# Patient Record
Sex: FEMALE | Race: WHITE | URBAN - METROPOLITAN AREA
[De-identification: names, ages, dates, MRNs, and addresses within clinical notes are randomized per-mention and may not be internally consistent; named-entity substitution may affect disease eponyms.]

---

## 2017-05-27 ENCOUNTER — OFFICE VISIT (OUTPATIENT)
Dept: FAMILY MEDICINE CLINIC | Facility: CLINIC | Age: 62
End: 2017-05-27

## 2017-05-27 VITALS
SYSTOLIC BLOOD PRESSURE: 162 MMHG | RESPIRATION RATE: 14 BRPM | WEIGHT: 220 LBS | HEART RATE: 84 BPM | TEMPERATURE: 98 F | HEIGHT: 63 IN | DIASTOLIC BLOOD PRESSURE: 98 MMHG | BODY MASS INDEX: 38.98 KG/M2

## 2017-05-27 DIAGNOSIS — J01.40 ACUTE NON-RECURRENT PANSINUSITIS: Primary | ICD-10-CM

## 2017-05-27 DIAGNOSIS — R03.0 ELEVATED BLOOD PRESSURE READING WITHOUT DIAGNOSIS OF HYPERTENSION: ICD-10-CM

## 2017-05-27 PROCEDURE — 99202 OFFICE O/P NEW SF 15 MIN: CPT | Performed by: NURSE PRACTITIONER

## 2017-05-27 RX ORDER — AMOXICILLIN AND CLAVULANATE POTASSIUM 875; 125 MG/1; MG/1
1 TABLET, FILM COATED ORAL 2 TIMES DAILY
Qty: 14 TABLET | Refills: 0 | Status: SHIPPED | OUTPATIENT
Start: 2017-05-27 | End: 2017-06-03

## 2017-05-27 NOTE — PROGRESS NOTES
CHIEF COMPLAINT:   Patient presents with:  URI      HPI:   Palma Mccollum is a 59year old female who presents with URI symptoms for 10 days. Onset was gradual.  Symptoms are progressing. Location is reported as mid face.   Description is reported as p HEAD: atraumatic, normocephalic, mild tenderness on palpation of maxillary sinuses is present.   EYES: conjunctiva clear, EOM intact, normal pupils, PERRLA  EARS: Canals are clear with no discharge or inflammation, TM's are pearly white and intact, no bulgi Sig: Take 1 tablet by mouth 2 (two) times daily. Risks, benefits, side effects of medication addressed and explained to patient.     2. Blood Pressure    Patient instructed to follow up with her PCP at next opportunity to further evaluate stefano ABRS may be diagnosed if you’ve had an upper respiratory infection like a cold and cough for longer than 10 to 14 days. Your health care provider will ask about your symptoms and your medical history.  The provider will check your vital signs, including you What is High Blood Pressure? High blood pressure (also called hypertension) is known as the “silent killer.” This is because most of the time it doesn’t cause symptoms. In fact, many people don’t know they have it until other problems develop.  In most [place] If your blood pressure is too high, work with your doctor on a plan for lowering it. Below are steps you can take that will help lower your blood pressure. · Choose heart-healthy foods. Eating healthier meals helps you control your blood pressure.  Ask you · Medicine is only one part of controlling high blood pressure. You also need to manage your weight, get regular exercise, and adjust your eating habits. · High blood pressure is usually a lifelong problem.  But it can be controlled with healthy lifestyle Do you eat a lot of salty, fatty, fried, or greasy foods? Do you go to restaurants or eat out frequently? ? Alcohol consumption  Do you drink more than 1 drink a day if you are a female or more than 2 drinks a day if you male?   ?  If you smoke or someone

## 2017-05-27 NOTE — PATIENT INSTRUCTIONS
Acute Bacterial Rhinosinusitis (ABRS)  Acute bacterial rhinosinusitis (ABRS) is an infection of your nasal cavity and sinuses. It’s caused by bacteria. Acute means that you’ve had symptoms for less than 12 weeks.   Understanding your sinuses  The nasal ca · Nasal decongestant medicine. Spray or drops may help to lessen congestion. Do not use them for more than a few days. · Salt wash (saline irrigation). This can help to loosen mucus.   Possible complications of ABRS  ABRS may come back or become long-term The circulatory system is made up of the heart and blood vessels that carry blood through the body. Your heart is the pump for this system. With each heartbeat (contraction), the heart sends blood out through large blood vessels called arteries.  Blood pres · Choose heart-healthy foods. Eating healthier meals helps you control your blood pressure. Ask your doctor about the DASH eating plan. This plan helps reduce blood pressure by limiting the amount of sodium (salt) you have in your diet.  DASH also encourage · High blood pressure is usually a lifelong problem. But it can be controlled with healthy lifestyle changes and medicine. · Hypertension is not the same as stress. Although stress may be a factor in high blood pressure, it’s only one part of the story. Do you drink more than 1 drink a day if you are a female or more than 2 drinks a day if you male?   ? If you smoke or someone close to you smokes  Do you smoke cigarettes or cigars, chew tobacco, or dip snuff?  Are you exposed to second-hand smoke on a regu

## 2018-04-18 ENCOUNTER — OFFICE VISIT (OUTPATIENT)
Dept: FAMILY MEDICINE CLINIC | Facility: CLINIC | Age: 63
End: 2018-04-18

## 2018-04-18 VITALS
SYSTOLIC BLOOD PRESSURE: 132 MMHG | HEART RATE: 93 BPM | OXYGEN SATURATION: 97 % | DIASTOLIC BLOOD PRESSURE: 96 MMHG | BODY MASS INDEX: 38.98 KG/M2 | TEMPERATURE: 98 F | WEIGHT: 220 LBS | HEIGHT: 63 IN

## 2018-04-18 DIAGNOSIS — R03.0 ELEVATED BLOOD PRESSURE READING: ICD-10-CM

## 2018-04-18 DIAGNOSIS — J00 ACUTE NASOPHARYNGITIS: Primary | ICD-10-CM

## 2018-04-18 PROCEDURE — 99212 OFFICE O/P EST SF 10 MIN: CPT | Performed by: NURSE PRACTITIONER

## 2018-04-18 NOTE — PROGRESS NOTES
CHIEF COMPLAINT:   Patient presents with:  URI: X 3 days, felt warm      HPI:   Anastacia Motley is a 57year old female who presents for upper respiratory symptoms for  3 days.  Patient reports sore throat only at the beginning of sx's, congestion, dry cou THROAT: Oral mucosa pink, moist. Posterior pharynx is not erythematous. no exudates. Tonsils 0/4. Uvula midline. PND +  NECK: Supple, non-tender  LUNGS: clear to auscultation bilaterally, no wheezes or rhonchi. Breathing is non labored.   CARDIO: RRR withou May consider OTC Cepacol as needed and directed on packaging fore sore throat. RTC if symptoms worsening/persistant or if new concerning symptoms present.      Patient Instructions       · Common colds are caused by viruses which are not eradicated with · You may use acetaminophen or ibuprofen to control pain and fever, unless another medicine was prescribed. If you have chronic liver or kidney disease, have ever had a stomach ulcer or gastrointestinal bleeding, or are taking blood-thinning medicines, thelma Colds are the most common illness that people get. Most adults get 2 or 3 colds per year, and most children get 5 to 7 colds per year. Colds may be caused by over 200 types of viruses. The most common of these are rhinoviruses (“rhino” refers to the nose). You can help reduce the spread of cold viruses. This can help both you and others avoid getting colds. Follow these tips:  · Wash your hands well anytime you may have come into contact with cold viruses. Wash your hands for at least 20 seconds.  When you ca Brand Names:  Altarussin DM, Cheracol D, Delsym Children's Cough + Chest Congestion DM, Delsym Cough + Chest Congestion DM, Diabetic Tussin DM, Duraganidin DM, Gani-Tuss DM NR, Genatuss DM, Guai-Dex, Guiatuss DM, Iophen DM-NR, Mucinex Children's Cough, Muci Do not take this medicine with any of the following medications:  · MAOIs like Carbex, Eldepryl, Marplan, Nardil, and Parnate  · procarbazine  This medicine may also interact with the following medications:  · other medicines for colds or allergy  · medici NOTE:This sheet is a summary. It may not cover all possible information. If you have questions about this medicine, talk to your doctor, pharmacist, or health care provider.  Copyright© 2017 [employer]vier            The patient indicates understanding of these i

## 2018-04-18 NOTE — PATIENT INSTRUCTIONS
· Common colds are caused by viruses which are not eradicated with antibiotics  · Cold remedies are to relieve symptoms and prevent complications rather than cure infection  · Rest and increased oral fluid intake is advised  · Increase humidity of the ai · You may use acetaminophen or ibuprofen to control pain and fever, unless another medicine was prescribed. If you have chronic liver or kidney disease, have ever had a stomach ulcer or gastrointestinal bleeding, or are taking blood-thinning medicines, thelma Colds are the most common illness that people get. Most adults get 2 or 3 colds per year, and most children get 5 to 7 colds per year. Colds may be caused by over 200 types of viruses. The most common of these are rhinoviruses (“rhino” refers to the nose). You can help reduce the spread of cold viruses. This can help both you and others avoid getting colds. Follow these tips:  · Wash your hands well anytime you may have come into contact with cold viruses. Wash your hands for at least 20 seconds.  When you ca Brand Names:  Altarussin DM, Cheracol D, Delsym Children's Cough + Chest Congestion DM, Delsym Cough + Chest Congestion DM, Diabetic Tussin DM, Duraganidin DM, Gani-Tuss DM NR, Genatuss DM, Guai-Dex, Guiatuss DM, Iophen DM-NR, Mucinex Children's Cough, Muci Do not take this medicine with any of the following medications:  · MAOIs like Carbex, Eldepryl, Marplan, Nardil, and Parnate  · procarbazine  This medicine may also interact with the following medications:  · other medicines for colds or allergy  · medici NOTE:This sheet is a summary. It may not cover all possible information. If you have questions about this medicine, talk to your doctor, pharmacist, or health care provider.  Copyright© 2017 Elsevier

## (undated) NOTE — MR AVS SNAPSHOT
[facility]  [phone]               Thank you for choosing us for your health care visit with Ashley Calvin NP.   We are glad to serve you and happy to provide you with this summary of Diagnosing ABRS  ABRS may be diagnosed if you’ve had an upper respiratory infection like a cold and cough for longer than 10 to 14 days. Your health care provider will ask about your symptoms and your medical history.  The provider will check your vital sig professional's instructions. What is High Blood Pressure? High blood pressure (also called hypertension) is known as the “silent killer.” This is because most of the time it doesn’t cause symptoms.  In fact, many people don’t know they have it until If your blood pressure is too high, work with your doctor on a plan for lowering it. Below are steps you can take that will help lower your blood pressure. · Choose heart-healthy foods. Eating healthier meals helps you control your blood pressure.  Ask you · Medicine is only one part of controlling high blood pressure. You also need to manage your weight, get regular exercise, and adjust your eating habits. · High blood pressure is usually a lifelong problem.  But it can be controlled with healthy lifestyle Do you eat a lot of salty, fatty, fried, or greasy foods? Do you go to restaurants or eat out frequently? ? Alcohol consumption  Do you drink more than 1 drink a day if you are a female or more than 2 drinks a day if you male?   ?  If you smoke or someone visit,  view other health information, and more. To sign up or find more information, go to [email or web address] and click on the Sign Up Now link in the [employer] box.      Enter your eeden Activation Code exactly as it appears below along with yo EAT THESE FOODS MORE OFTEN: EAT THESE FOODS LESS OFTEN:   Make half your plate fruits and vegetables Highly refined, white starches including white bread, rice and pasta   Eat plenty of protein, keep the fat content low Sugars:  sodas and sports drinks, ca